# Patient Record
Sex: FEMALE | Race: WHITE | URBAN - METROPOLITAN AREA
[De-identification: names, ages, dates, MRNs, and addresses within clinical notes are randomized per-mention and may not be internally consistent; named-entity substitution may affect disease eponyms.]

---

## 2017-05-16 ENCOUNTER — CARE COORDINATION (OUTPATIENT)
Dept: GASTROENTEROLOGY | Facility: CLINIC | Age: 72
End: 2017-05-16

## 2017-05-16 NOTE — PROGRESS NOTES
Advanced [facility] Clinic Intake:    Referring provider: Dr Lizette Combs, [facility] in [facility], Phone is [phone], Fax is [phone], any nurse can assist     Referred to: [facility] Advanced Endoscopy providers: Dr Mathews, pt was last seen in 2011     Referral Received: 5/15/2017    Records received: Per clinic no recent GI records    MD review date:   Sent to Dr. Mathews to review.                             Requested procedure: Clinic visit    Evaluation/Indication for: Pancreatitis and increase in amylase level. Recent lab was done on 5/9/17- amylase level was 152, abdominal pain.    Clinical History (per RN review of records provided):   Patient aware of request for clinc consultation and ok to be contacted to schedule. Yes   5/16/2017 1600 called to clinic to ask about more recent records/imaging: no recent imaging since 2011.     Progress note from 4/6/2016: states she is having more epigastric pain but not like her past pancreas attacks.    Recommendations/Orders:    Per Dr. Mathews:  1. Clinic visit.     Sent to scheduling.     Aissatou COLLINS RN Coordinator  Dr. Mathews, Dr. Cantu & Dr. Velarde  Pancreas~Biliary  [phone] #4

## 2017-06-27 ENCOUNTER — TELEPHONE (OUTPATIENT)
Dept: GASTROENTEROLOGY | Facility: CLINIC | Age: 72
End: 2017-06-27

## 2017-06-27 NOTE — TELEPHONE ENCOUNTER
Spoke with Caroline and reminded her of upcoming appointment with Dr. Mathews 7/3.  She plans to attend.

## 2017-07-03 ENCOUNTER — OFFICE VISIT (OUTPATIENT)
Dept: GASTROENTEROLOGY | Facility: CLINIC | Age: 72
End: 2017-07-03

## 2017-07-03 VITALS
HEIGHT: 68 IN | OXYGEN SATURATION: 95 % | HEART RATE: 64 BPM | WEIGHT: 232.7 LBS | BODY MASS INDEX: 35.27 KG/M2 | DIASTOLIC BLOOD PRESSURE: 79 MMHG | SYSTOLIC BLOOD PRESSURE: 149 MMHG | TEMPERATURE: 98.2 F

## 2017-07-03 DIAGNOSIS — Z71.3 NUTRITIONAL COUNSELING: ICD-10-CM

## 2017-07-03 DIAGNOSIS — K85.00 IDIOPATHIC ACUTE PANCREATITIS WITHOUT INFECTION OR NECROSIS: Primary | ICD-10-CM

## 2017-07-03 DIAGNOSIS — K86.1 CHRONIC PANCREATITIS (H): Primary | ICD-10-CM

## 2017-07-03 DIAGNOSIS — K85.90 ACUTE PANCREATITIS: ICD-10-CM

## 2017-07-03 RX ORDER — NICOTINE POLACRILEX 4 MG/1
20 GUM, CHEWING ORAL DAILY
Qty: 90 TABLET | Refills: 3 | Status: ON HOLD | OUTPATIENT
Start: 2017-07-03 | End: 2017-08-10

## 2017-07-03 RX ORDER — AMPICILLIN TRIHYDRATE 250 MG
600 CAPSULE ORAL DAILY
COMMUNITY

## 2017-07-03 ASSESSMENT — ENCOUNTER SYMPTOMS
HYPOTENSION: 0
POLYPHAGIA: 0
CLAUDICATION: 0
CHILLS: 0
WEIGHT GAIN: 1
NIGHT SWEATS: 0
POLYDIPSIA: 0
ABDOMINAL PAIN: 1
BLOATING: 1
HEARTBURN: 1
LIGHT-HEADEDNESS: 0
ALTERED TEMPERATURE REGULATION: 0
FEVER: 0
SYNCOPE: 0
INCREASED ENERGY: 0
SKIN CHANGES: 0
ORTHOPNEA: 0
VOMITING: 0
SLEEP DISTURBANCES DUE TO BREATHING: 0
WEIGHT LOSS: 0
DECREASED APPETITE: 0
NAUSEA: 0
POOR WOUND HEALING: 0
EXERCISE INTOLERANCE: 0
TACHYCARDIA: 0
LEG SWELLING: 1
LEG PAIN: 0
HYPERTENSION: 0
PALPITATIONS: 0
NAIL CHANGES: 0
FATIGUE: 1
HALLUCINATIONS: 0

## 2017-07-03 ASSESSMENT — PAIN SCALES - GENERAL: PAINLEVEL: NO PAIN (0)

## 2017-07-03 NOTE — PROGRESS NOTES
Pt referred by Primary Dr Combs for evaluation of recurrent acute and chronic pancreatitis    Seen by us 2011  Note below  CONSULTATION     SUBJECTIVE:  Caroline is a very pleasant 66-year-old who was referred by Dr. Onesimo Monroe for unexplained acute recurrent pancreatitis.  Generally, she has been healthy.  She is mildly overweight.  She is remotely post cholecystectomy.  She had a bout of severe pancreatitis in 2009, normal liver enzymes, normal calcium and triglycerides, autoimmune markers.  She had a protracted hospital course with either an infected pseudocyst or peripancreatic abscess, managed percutaneously by Interventional Radiology.  That drain had to be in place for a month, I think up to 6 months because of persistent fistula but it eventually closed when they backed the drain out.  She eventually recovered and did well.  She is on no medications or offending medications or alcohol.  Then, she had a recurrent bout in May with 5 days of hospitalization again.  Liver chemistries, triglycerides and calcium levels normal.  MRCP normal.  To my review, it shows normal pancreatic duct with patent dorsal and ventral ducts that merged normally.  No evidence of pancreas divisum or dilated ducts.  She has had a negative EGD with negative celiac biopsies.  She is otherwise remarkably healthy.  She does not smoke or drink.  MEDICATIONS:  Her medications are all nutritional supplements and vitamins, probiotics.  She is on omeprazole 40 mg twice a day.  PAST MEDICAL HISTORY:  She does have a history of a hiatal hernia.  I am not sure if there is a real reflux history.  Her other history is significant for having a chest tube for pleural effusion around the time of her pancreatitis.  SOCIAL HISTORY:  She is an active person.  She rides her bicycle.  She feels fine now.  She really does not have any significant discomfort.  REVIEW OF SYSTEMS:  Review of systems is otherwise negative.  PHYSICAL EXAMINATION:  On  physical examination, she has no abdominal tenderness.  IMPRESSION:  She has had 2 bouts of unexplained acute recurrent pancreatitis, post cholecystectomy with normal anatomy.  The first one was severe with either peripancreatic necrosis, infected fluid collection, infected necrosis, all drained percutaneously.  She is now doing fine but she has had a second bout in May, self limited.   We spent a total of 40 minutes in counseling, discussing the etiology which could be idiopathic sphincter of Oddi dysfunction and very unlikely any other cause as she has had a normal MRCP.  We talked about the need for endoscopic ultrasound which is tomorrow and I gave her a number of articles that we have written on this topic to review about possible interventions, it would be either continued observation or ERCP with sphincter of Oddi manometry, possible biliary and pancreatic sphincterotomy.  We went over the risks and uncertain benefits, the small chance of a severe life-threatening complication, 10% chance of overall mild complications and the uncertain efficacy, especially in absence of an anatomic defect, some of the controversy.  We are going to give her the articles to return home after her EUS and she will think about whether she would like intervention.  If she would, she will come back to see us.  I would like to thank Dr. Monroe for sending her to see us.   Total time spent was 40 minutes, mostly in counseling.       Hesham Mathews M.D.  Professor                                  EUS in 2011                            - Findings suggestive of pancreas divisum - both                             the dorsal and ventral ducts are small however only                             the dorsal duct appears to communicate with the                             main duct                            - Atrophic pancreas with small pancreatic duct in                             the head, small cyst in the tail and visible                              side-branches. These findings may be sequale of                             severe acute pancreatitis or due to chronic                             pancreatitis (pt denies symptoms other than related                             to the two episodes of acute pancreatitis)                            - Normal CBD with no stones or sludge                            Findings d/w patient and . Informed that a                             repeat MRCP to evluate the pancreatic duct to                             confirm divisum would be helpful. Pt is agreeable.  ERCP 2011  Pancreatic divisum with very small minor papilla                             which was identified only after secretin injection                             and methylene blue spray                            Minor papillotomy done and a temporary plastic                             stent placed in the dorsal main pancreatic duct    SINCE THEN: did amazingly well, no pancreas symptoms, normal diet, no meds other than nutritional supplemnents.   One month ago attack of band like pain, one day, now just pressure like sensation in epigastrium, some reflux type symptoms. Not meal related. Weight gain and new pitting edema in legs.    PEX limited to legs: pitting edema 2+ bilaterally    IMP: 45 mints more than 50% counseling. Recurrent acute pancreatitis, evidence of chronic pancreatitis, divisum, unusually good response to single minor papilla intervention.   Possibilities:  1) restenosis of minor papilla  2) Progression of chronoic pancreatitis  3) Reflux esophagitis.  4) Unrelated to pancreas  5) Edema of unclear origin    REC  1) CMP, lipase, amylase, CBC  2) Return for secretin MRCP, ERCP next day  3) PERT, PPI  4) dietitian today

## 2017-07-03 NOTE — NURSING NOTE
"Chief Complaint   Patient presents with     Consult     New referral        Vitals:    07/03/17 1035   BP: 149/79   Pulse: 64   Temp: 98.2  F (36.8  C)   TempSrc: Oral   SpO2: 95%   Weight: 232 lb 11.2 oz   Height: 5' 8\"       Body mass index is 35.38 kg/(m^2).    Merissa Leger CMA                          "

## 2017-07-03 NOTE — PATIENT INSTRUCTIONS
It was a pleasure to meet you today.  -Do not skip meals. Eat at least 3 meals per day.  -Eat a low fat diet (see additional handouts)  -Take Enzymes as directed. Recommend taking 3 capsules with meals and 2 capsules with snacks. Take enzymes at the beginning of the meal or snack.  If you would like to schedule a follow up visit with the Registered Dietitian. Please call [phone] to schedule.

## 2017-07-03 NOTE — LETTER
7/3/2017       RE: Caroline Soto  [address]  \[facility]\"" [id number]     Dear Colleague,    Thank you for referring your patient, Caroline Soto, to the [facility] PAN[facility] B[facility]. Please see a copy of my visit note below.    Pt referred by Primary Dr Combs for evaluation of recurrent acute and chronic pancreatitis    Seen by us 2011  Note below  CONSULTATION     SUBJECTIVE:  Caroline is a very pleasant 66-year-old who was referred by Dr. Onesimo Monroe for unexplained acute recurrent pancreatitis.  Generally, she has been healthy.  She is mildly overweight.  She is remotely post cholecystectomy.  She had a bout of severe pancreatitis in 2009, normal liver enzymes, normal calcium and triglycerides, autoimmune markers.  She had a protracted hospital course with either an infected pseudocyst or peripancreatic abscess, managed percutaneously by Interventional Radiology.  That drain had to be in place for a month, I think up to 6 months because of persistent fistula but it eventually closed when they backed the drain out.  She eventually recovered and did well.  She is on no medications or offending medications or alcohol.  Then, she had a recurrent bout in May with 5 days of hospitalization again.  Liver chemistries, triglycerides and calcium levels normal.  MRCP normal.  To my review, it shows normal pancreatic duct with patent dorsal and ventral ducts that merged normally.  No evidence of pancreas divisum or dilated ducts.  She has had a negative EGD with negative celiac biopsies.  She is otherwise remarkably healthy.  She does not smoke or drink.  MEDICATIONS:  Her medications are all nutritional supplements and vitamins, probiotics.  She is on omeprazole 40 mg twice a day.  PAST MEDICAL HISTORY:  She does have a history of a hiatal hernia.  I am not sure if there is a real reflux history.  Her other history is significant for having a chest tube for pleural  effusion around the time of her pancreatitis.  SOCIAL HISTORY:  She is an active person.  She rides her bicycle.  She feels fine now.  She really does not have any significant discomfort.  REVIEW OF SYSTEMS:  Review of systems is otherwise negative.  PHYSICAL EXAMINATION:  On physical examination, she has no abdominal tenderness.  IMPRESSION:  She has had 2 bouts of unexplained acute recurrent pancreatitis, post cholecystectomy with normal anatomy.  The first one was severe with either peripancreatic necrosis, infected fluid collection, infected necrosis, all drained percutaneously.  She is now doing fine but she has had a second bout in May, self limited.   We spent a total of 40 minutes in counseling, discussing the etiology which could be idiopathic sphincter of Oddi dysfunction and very unlikely any other cause as she has had a normal MRCP.  We talked about the need for endoscopic ultrasound which is tomorrow and I gave her a number of articles that we have written on this topic to review about possible interventions, it would be either continued observation or ERCP with sphincter of Oddi manometry, possible biliary and pancreatic sphincterotomy.  We went over the risks and uncertain benefits, the small chance of a severe life-threatening complication, 10% chance of overall mild complications and the uncertain efficacy, especially in absence of an anatomic defect, some of the controversy.  We are going to give her the articles to return home after her EUS and she will think about whether she would like intervention.  If she would, she will come back to see us.  I would like to thank Dr. Monroe for sending her to see us.   Total time spent was 40 minutes, mostly in counseling.       Hesham Mathews M.D.  Professor                                  EUS in 2011                            - Findings suggestive of pancreas divisum - both                             the dorsal and ventral ducts are small however only                              the dorsal duct appears to communicate with the                             main duct                            - Atrophic pancreas with small pancreatic duct in                             the head, small cyst in the tail and visible                             side-branches. These findings may be sequale of                             severe acute pancreatitis or due to chronic                             pancreatitis (pt denies symptoms other than related                             to the two episodes of acute pancreatitis)                            - Normal CBD with no stones or sludge                            Findings d/w patient and . Informed that a                             repeat MRCP to evluate the pancreatic duct to                             confirm divisum would be helpful. Pt is agreeable.  ERCP 2011  Pancreatic divisum with very small minor papilla                             which was identified only after secretin injection                             and methylene blue spray                            Minor papillotomy done and a temporary plastic                             stent placed in the dorsal main pancreatic duct    SINCE THEN: did amazingly well, no pancreas symptoms, normal diet, no meds other than nutritional supplemnents.   One month ago attack of band like pain, one day, now just pressure like sensation in epigastrium, some reflux type symptoms. Not meal related. Weight gain and new pitting edema in legs.    PEX limited to legs: pitting edema 2+ bilaterally    IMP: 45 mints more than 50% counseling. Recurrent acute pancreatitis, evidence of chronic pancreatitis, divisum, unusually good response to single minor papilla intervention.   Possibilities:  1) restenosis of minor papilla  2) Progression of chronoic pancreatitis  3) Reflux esophagitis.  4) Unrelated to pancreas  5) Edema of unclear origin    REC  1) CMP, lipase, amylase, CBC  2)  Return for secretin MRCP, ERCP next day  3) PERT, PPI  4) dietitian today     Again, thank you for allowing me to participate in the care of your patient.      Sincerely,    Hesham Mathews MD

## 2017-07-03 NOTE — PROGRESS NOTES
"[facility] Outpatient Medical Nutrition Therapy      Time Spent:  15 minutes  Session Type:  Individual Session  Referring Physician:  Dr. Hesham Mathews  Reason for RD Visit:   Acute and chronic pancreatitis. Starting enzymes.     Nutrition Assessment:  Patient is here for initial visit with Registered Dietitian (RD).  Patient is a 72 y.o. female with history of acute and chronic pancreatitis, GERD, obstructive sleep apnea, DVT and abscess of pancreas. Patient c/o pitting edema. Patient stated that in past, she had been in the hospital for 2 months and did not eat orally only ice chips. Had a PICC line at that time. Believes she may have had TPN and/or NG tube feeding. Also had drains in place at that time. Reported losing 109# during that hospitalization. Stated that overall her diet is healthy and usually eats 2-3 meals per day. Has a tendency to skip lunch meal or have some snacks. See diet recall below. Patient had 2 bouts of acute pancreatitis more recently. Saw MD today, and MD starting patient on Creon 24,000 enzymes.    Height: 68\"  Weight:  232# (105.6 kg). Reported 109# weight loss when was in hospital.  BMI: 35.46. Class II obesity    Diet Recall:    Meal Food    Breakfast Protein shake made with almond milk and spinach    Lunch Skips OR Ancient nutrient drink (water + nutrient powder with added liquid calcium and liquid minerals) OR yogurt with cranberries and hemp seeds and stevia OR sliced deli chicken/roast beef with dill pickle   Dinner Organic beef/organic chicken with kale/spinach/chard/tomatoes and fruit   Snacks Black or green olives (several times per week.) Rare dessert (rice pudding)    Beverages 6-8 cups or more Filtered water, coconut water and sometimes aloe vera juice or pollen burst drink     Alcohol intake: denies     Labs:  reviewed  Pertinent Medications/vitamin and mineral supplements:   Red yeast rice, turmeric, omeparazole, garlic oil, bee pollen, spirulina, 1000 IU vitamin D and " chlorella. Starting Creon 24,000.   Food Allergies:  reported gluten intolerance  Physical Activity:  Walks dog 3-5x/day, tracks steps and takes 8,000-9,000 steps per day. Does Yoga 1x/week. [place] 2 [place]  Nutrition Prescription: Based on ideal body weight of 140# (64kg):  6854-9221 calories (25-30 kcals/kg), 64g protein (1g/kg), 1890 ml fluids (~1 ml/kcal or total fluids per MD).     Nutrition Diagnosis:    Altered GI function related to medical diagnosis, pain after eating as evidenced by patient reports and dx acute and chronic pancreatitis.    Nutrition Intervention:    1. Nutrition Education:   Nutrition Education: Provided diet education for pancreatitis as follows:  -Follow a lowfat diet. Reviewed foods higher in fat and tips/suggestions for selecting lower fat substitutions/foods. Recommended patient aim for ~40-50 g fat or less per day. This is ~25% of calories from fat per day based on estimated calories needs.  -Encouraged patient to not skip meals and aim for at least 3 meals per day. Told patient can eat 4-6 smaller balanced meals/snacks per day if better tolerated.  -Take enzymes as directed. Recommend taking 3 capsules of Creon 24,000 with meals and 2 capsules with snacks. This will provide 682 units of lipase per meal and 455 units lipase per snacks. Gave patient low fat diet and pancreatitis diet education handouts.       Educational Materials Provided:  Lowfat diet handout and pancreatitis medical nutrition therapy education handout. Patient verbalized understanding of education provided. See all recommendations under Goals below.     Goals:  -Do not skip meals. Eat at least 3 meals per day.  -Eat a low fat diet (see additional handouts)  -Take Enzymes as directed. Recommend taking 3 capsules with meals and 2 capsules with snacks. Take enzymes at the beginning of the meal or snack.    Nutrition Monitoring and Evaluation: Will monitor adherence to nutrition recommendations at any future  RD visits.     Further Medical Nutrition Therapy:  PRN  Next Appointment (if applicable):  PRN  Patient was encouraged to call/contact RD with any further questions.    Lindsay Hughes MS, RD, LD

## 2017-07-03 NOTE — NURSING NOTE
Reviewed today's consult and answered patient's questions.  Patient verbalized understanding and agreed to call me with any questions / concerns in the future and confirmed having our contact information.  Please see patient instructions below.

## 2017-07-03 NOTE — PATIENT INSTRUCTIONS
1. Please follow-up with your primary care to address the pitting edema.    2. Creon sent to pharmacy. Take 2-3 with meals and 1-2 with snacks.     3. Omeprazole sent to pharmacy take 20mg daily.     4. We will call you to organize MRCP and ERCP on another visit.       Follow up: MRCP and ERCP     Please call with any questions or concerns regarding your clinic visit today.    It is a pleasure being involved in your health care.    Contacts post-consultation depending on your need:    Schedule Clinic Appointments       [phone]       M-F 7:30 - 5 pm    Aissatou COLLINS RN Coordinator              [phone] #4   M-F 8:00 - 4:30pm    Procedure Scheduling                   [phone]    My Chart is available 24 hours a day and is a secure way to access your records and communicate with your care team.  I strongly recommend signing up if you haven't already done so, if you are comfortable with computers.  If you would like to inquire about this or are having problems with My Chart access, you may call [phone] or go online at [email or web address].  Please allow at least 24 hours for a response and extra time on weekends and Holidays.

## 2017-07-03 NOTE — LETTER
"7/3/2017       RE: Caroline Soto  [address]  [facility] [address]     Dear Colleague,    Thank you for referring your patient, Caroline Soto, to the [facility] GASTROENTEROLOGY AND IBD at [facility]. Please see a copy of my visit note below.    [facility] Outpatient Medical Nutrition Therapy      Time Spent:  15 minutes  Session Type:  Individual Session  Referring Physician:  Dr. Hesham Mathews  Reason for RD Visit:   Acute and chronic pancreatitis. Starting enzymes.     Nutrition Assessment:  Patient is here for initial visit with Registered Dietitian (RD).  Patient is a 72 y.o. female with history of acute and chronic pancreatitis, GERD, obstructive sleep apnea, DVT and abscess of pancreas. Patient c/o pitting edema. Patient stated that in past, she had been in the hospital for 2 months and did not eat orally only ice chips. Had a PICC line at that time. Believes she may have had TPN and/or NG tube feeding. Also had drains in place at that time. Reported losing 109# during that hospitalization. Stated that overall her diet is healthy and usually eats 2-3 meals per day. Has a tendency to skip lunch meal or have some snacks. See diet recall below. Patient had 2 bouts of acute pancreatitis more recently. Saw MD today, and MD starting patient on Creon 24,000 enzymes.    Height: 68\"  Weight:  232# (105.6 kg). Reported 109# weight loss when was in hospital.  BMI: 35.46. Class II obesity    Diet Recall:    Meal Food    Breakfast Protein shake made with almond milk and spinach    Lunch Skips OR Ancient nutrient drink (water + nutrient powder with added liquid calcium and liquid minerals) OR yogurt with cranberries and hemp seeds and stevia OR sliced deli chicken/roast beef with dill pickle   Dinner Organic beef/organic chicken with kale/spinach/chard/tomatoes and fruit   Snacks Black or green olives (several times per week.) Rare dessert (rice pudding)    Beverages 6-8 cups or more " Filtered water, coconut water and sometimes aloe vera juice or pollen burst drink     Alcohol intake: denies     Labs:  reviewed  Pertinent Medications/vitamin and mineral supplements:   Red yeast rice, turmeric, omeparazole, garlic oil, bee pollen, spirulina, 1000 IU vitamin D and chlorella. Starting Creon 24,000.   Food Allergies:  reported gluten intolerance  Physical Activity:  Walks dog 3-5x/day, tracks steps and takes 8,000-9,000 steps per day. Does Yoga 1x/week. [place]  Nutrition Prescription: Based on ideal body weight of 140# (64kg):  5219-2901 calories (25-30 kcals/kg), 64g protein (1g/kg), 1890 ml fluids (~1 ml/kcal or total fluids per MD).     Nutrition Diagnosis:    Altered GI function related to medical diagnosis, pain after eating as evidenced by patient reports and dx acute and chronic pancreatitis.    Nutrition Intervention:    1. Nutrition Education:   Nutrition Education: Provided diet education for pancreatitis as follows:  -Follow a lowfat diet. Reviewed foods higher in fat and tips/suggestions for selecting lower fat substitutions/foods. Recommended patient aim for ~40-50 g fat or less per day. This is ~25% of calories from fat per day based on estimated calories needs.  -Encouraged patient to not skip meals and aim for at least 3 meals per day. Told patient can eat 4-6 smaller balanced meals/snacks per day if better tolerated.  -Take enzymes as directed. Recommend taking 3 capsules of Creon 24,000 with meals and 2 capsules with snacks. This will provide 682 units of lipase per meal and 455 units lipase per snacks. Gave patient low fat diet and pancreatitis diet education handouts.       Educational Materials Provided:  Lowfat diet handout and pancreatitis medical nutrition therapy education handout. Patient verbalized understanding of education provided. See all recommendations under Goals below.     Goals:  -Do not skip meals. Eat at least 3 meals per day.  -Eat a low fat  diet (see additional handouts)  -Take Enzymes as directed. Recommend taking 3 capsules with meals and 2 capsules with snacks. Take enzymes at the beginning of the meal or snack.    Nutrition Monitoring and Evaluation: Will monitor adherence to nutrition recommendations at any future RD visits.     Further Medical Nutrition Therapy:  PRN  Next Appointment (if applicable):  PRN  Patient was encouraged to call/contact RD with any further questions.    Lindsay Hughes, MS, RD, LD

## 2017-07-03 NOTE — MR AVS SNAPSHOT
After Visit Summary   7/3/2017    Caroline Soto    MRN: [id number]           Patient Information     Date Of Birth          1945        Visit Information        Provider Department      7/3/2017 11:00 AM Hesham Mathews MD [facility] Pancreas and Biliary        Today's Diagnoses     Pancreatitis    -  1      Care Instructions    1. Please follow-up with your primary care to address the pitting edema.    2. Creon sent to pharmacy. Take 2-3 with meals and 1-2 with snacks.     3. Omeprazole sent to pharmacy take 20mg daily.     4. We will call you to organize MRCP and ERCP on another visit.       Follow up: MRCP and ERCP     Please call with any questions or concerns regarding your clinic visit today.    It is a pleasure being involved in your health care.    Contacts post-consultation depending on your need:    Schedule Clinic Appointments       [phone]       M-F 7:30 - 5 pm    Aissatou COLLINS RN Coordinator              [phone] #4   M-F 8:00 - 4:30pm    Procedure Scheduling                   [phone]    My Chart is available 24 hours a day and is a secure way to access your records and communicate with your care team.  I strongly recommend signing up if you haven't already done so, if you are comfortable with computers.  If you would like to inquire about this or are having problems with My Chart access, you may call [phone] or go online at [email or web address].  Please allow at least 24 hours for a response and extra time on weekends and Holidays.              Follow-ups after your visit        Additional Services     NUTRITION REFERRAL       To see Lindsay KRAFT                  Future tests that were ordered for you today     Open Future Orders        Priority Expected Expires Ordered    MR Abdomen MRCP w/o & w Contrast Routine  7/3/2018 7/3/2017            Who to contact     Please call your clinic at [phone] to:    Ask questions about your health    Make or cancel  "appointments    Discuss your medicines    Learn about your test results    Speak to your doctor   If you have compliments or concerns about an experience at your clinic, or if you wish to file a complaint, please contact [facility] Patient Relations at [phone] or email us at [email or web address]         Additional Information About Your Visit        [employer] Information     [employer] is an electronic gateway that provides easy, online access to your medical records. With [employer], you can request a clinic appointment, read your test results, renew a prescription or communicate with your care team.     To sign up for [employer] visit the website at [email or web address]   You will be asked to enter the access code listed below, as well as some personal information. Please follow the directions to create your username and password.     Your access code is: [email or web address]  Expires: 2017  6:30 AM     Your access code will  in 90 days. If you need help or a new code, please contact your [facility] or call [phone] for assistance.        Care EveryWhere ID     This is your Care EveryWhere ID. This could be used by other organizations to access your [place] medical records  [id number]        Your Vitals Were     Pulse Temperature Height Pulse Oximetry BMI (Body Mass Index)       64 98.2  F (36.8  C) (Oral) 1.727 m (5' 8\") 95% 35.38 kg/m2        Blood Pressure from Last 3 Encounters:   17 149/79   11 133/58   11 130/59    Weight from Last 3 Encounters:   17 105.6 kg (232 lb 11.2 oz)   11 107.6 kg (237 lb 3.2 oz)   11 103.4 kg (228 lb)              We Performed the Following     ERCP     NUTRITION REFERRAL          Today's Medication Changes          These changes are accurate as of: 7/3/17 12:45 PM.  If you have any questions, ask your nurse or doctor.               Start taking these medicines.     "    Dose/Directions    amylase-lipase-protease 83311 UNITS Cpep per EC capsule   Commonly known as:  CREON   Used for:  Pancreatitis   Started by:  Hesham Mathews MD        Take 2-3 with meals / 1-2 with snacks, up to 15 per day.   Quantity:  450 capsule   Refills:  6       omeprazole 20 MG tablet   Used for:  Pancreatitis   Replaces:  omeprazole 40 MG capsule   Started by:  Hesham Mathews MD        Dose:  20 mg   Take 1 tablet (20 mg) by mouth daily   Quantity:  90 tablet   Refills:  3         Stop taking these medicines if you haven't already. Please contact your care team if you have questions.     HYDROmorphone 2 MG tablet   Commonly known as:  DILAUDID   Stopped by:  Hesham Mathews MD           l-arginine 1000 MG tablet   Stopped by:  Hesham Mathews MD           OMEGA 3-6-9 FATTY ACIDS PO   Stopped by:  Hesham Mathews MD           omeprazole 40 MG capsule   Commonly known as:  priLOSEC   Replaced by:  omeprazole 20 MG tablet   Stopped by:  Hesham Mathews MD           vitamin E 1000 UNIT capsule   Commonly known as:  TOCOPHEROL   Stopped by:  Hesham Mathews MD                Where to get your medicines      These medications were sent to [address] - [address] [facility]  [address] [facility] [address]     Phone:  [phone]     amylase-lipase-protease 86918 UNITS Cpep per EC capsule    omeprazole 20 MG tablet                Primary Care Provider Office Phone # Fax #    Lizette RODRÍGUEZ [place],  [phone]       JEAN HUERTAS [address]   [facility] [address]        Equal Access to Services     [facility] AH: Hadii aad ku hadasho Soomaali, waaxda luqadaha, qaybta kaalmada adeegyada, waxay gerard cao. So [facility] [phone].    ATENCIÓN: Si habla español, tiene a vallejo disposición servicios gratuitos de asistencia lingüística. Llame al [phone].    We comply with applicable federal civil rights laws  and [place] laws. We do not discriminate on the basis of race, color, national origin, age, disability sex, sexual orientation or gender identity.            Thank you!     Thank you for choosing [facility] PANCREAS AND BILIARY  for your care. Our goal is always to provide you with excellent care. Hearing back from our patients is one way we can continue to improve our services. Please take a few minutes to complete the written survey that you may receive in the mail after your visit with us. Thank you!             Your Updated Medication List - Protect others around you: Learn how to safely use, store and throw away your medicines at www.disposemymeds.org.          This list is accurate as of: 7/3/17 12:45 PM.  Always use your most recent med list.                   Brand Name Dispense Instructions for use Diagnosis    amylase-lipase-protease 47314 UNITS Cpep per EC capsule    CREON    450 capsule    Take 2-3 with meals / 1-2 with snacks, up to 15 per day.    Pancreatitis       Bee Pollen 500 MG Tabs      Take 1 capsule by mouth daily.        Chlorella 500 MG Caps      Take 9 tablets by mouth daily.        ODORLESS GARLIC 500 MG Tabs   Generic drug:  Garlic Oil      Take 1 capsule by mouth daily. With cayenne pepper        omeprazole 20 MG tablet     90 tablet    Take 1 tablet (20 mg) by mouth daily    Pancreatitis       red yeast rice 600 MG Caps      Take 600 mg by mouth daily        Spirulina 500 MG Tabs      Take 9 tablets by mouth daily.        TURMERIC PO           * UNABLE TO FIND      MEDICATION NAME: Hemp parts        * UNABLE TO FIND      MEDICATION NAME: Jett Seeds        VITAMIN D PO      Take 1,000 Units by mouth daily. With citrate        * Notice:  This list has 2 medication(s) that are the same as other medications prescribed for you. Read the directions carefully, and ask your doctor or other care provider to review them with you.

## 2017-07-03 NOTE — MR AVS SNAPSHOT
After Visit Summary   7/3/2017    Caroline Soto    MRN: [id number]           Patient Information     Date Of Birth          1945        Visit Information        Provider Department      7/3/2017 2:00 PM Lindsay Hughes RD M [facility] Gastroenterology and IBD        Care Instructions    It was a pleasure to meet you today.  -Do not skip meals. Eat at least 3 meals per day.  -Eat a low fat diet (see additional handouts)  -Take Enzymes as directed. Recommend taking 3 capsules with meals and 2 capsules with snacks. Take enzymes at the beginning of the meal or snack.  If you would like to schedule a follow up visit with the Registered Dietitian. Please call [phone] to schedule.            Follow-ups after your visit        Your next 10 appointments already scheduled     Jul 03, 2017  2:00 PM CDT   (Arrive by 1:45 PM)   New Patient Visit with AJIT Vides [facility] Gastroenterology and IBD ([facility] [place])    [address] [place] [address] [phone]              Future tests that were ordered for you today     Open Future Orders        Priority Expected Expires Ordered    MR Abdomen MRCP w/o & w Contrast Routine  7/3/2018 7/3/2017            Who to contact     Please call your clinic at [phone] to:    Ask questions about your health    Make or cancel appointments    Discuss your medicines    Learn about your test results    Speak to your doctor   If you have compliments or concerns about an experience at your clinic, or if you wish to file a complaint, please contact [facility] Patient Relations at [phone] or email us at [email or web address]         Additional Information About Your Visit        MyChart Information     [employer] is an electronic gateway that provides easy, online access to your medical records. With [employer], you can request a clinic appointment, read your test results, renew a  prescription or communicate with your care team.     To sign up for [employer] visit the website at [email or web address]   You will be asked to enter the access code listed below, as well as some personal information. Please follow the directions to create your username and password.     Your access code is: [email or web address]  Expires: 2017  6:30 AM     Your access code will  in 90 days. If you need help or a new code, please contact your [facility] or call [phone] for assistance.        Care EveryWhere ID     This is your Care EveryWhere ID. This could be used by other organizations to access your [place] medical records  [id number]         Blood Pressure from Last 3 Encounters:   17 149/79   11 133/58   11 130/59    Weight from Last 3 Encounters:   17 105.6 kg (232 lb 11.2 oz)   11 107.6 kg (237 lb 3.2 oz)   11 103.4 kg (228 lb)              Today, you had the following     No orders found for display         Today's Medication Changes          These changes are accurate as of: 7/3/17  1:46 PM.  If you have any questions, ask your nurse or doctor.               Start taking these medicines.        Dose/Directions    amylase-lipase-protease 16792 UNITS Cpep per EC capsule   Commonly known as:  CREON   Used for:  Idiopathic acute pancreatitis without infection or necrosis   Started by:  Hesham Mathews MD        Take 2-3 with meals / 1-2 with snacks, up to 15 per day.   Quantity:  450 capsule   Refills:  6       omeprazole 20 MG tablet   Used for:  Idiopathic acute pancreatitis without infection or necrosis   Replaces:  omeprazole 40 MG capsule   Started by:  Hesham Mathews MD        Dose:  20 mg   Take 1 tablet (20 mg) by mouth daily   Quantity:  90 tablet   Refills:  3         Stop taking these medicines if you haven't already. Please contact your care team if you have questions.     HYDROmorphone 2 MG tablet    Commonly known as:  DILAUDID   Stopped by:  Hesham Mathews MD           l-arginine 1000 MG tablet   Stopped by:  Hesham Mathews MD           OMEGA 3-6-9 FATTY ACIDS PO   Stopped by:  Hesham Mathews MD           omeprazole 40 MG capsule   Commonly known as:  priLOSEC   Replaced by:  omeprazole 20 MG tablet   Stopped by:  Hesham Mathews MD           vitamin E 1000 UNIT capsule   Commonly known as:  TOCOPHEROL   Stopped by:  Hesham Mathews MD                Where to get your medicines      [facility] [address] [facility] [place] [facility] [address] [facility]  [address] [facility] [address]     Phone:  [phone]     amylase-lipase-protease 57885 UNITS Cpep per EC capsule    omeprazole 20 MG tablet                Primary Care Provider Office Phone # Fax #    Lizette RODRÍGUEZ DO [place] [phone]       JEAN HUERTAS [address]   [facility] [address]        Equal Access to Services     [facility] AH: Hadii aad ku hadasho Soomaali, waaxda luqadaha, qaybta kaalmada adeegyada, waxay idiin hayaan jeff kharaerick comer . So [facility] [phone].    ATENCIÓN: Si marcosla español, tiene a vallejo disposición servicios gratuitos de asistencia lingüística. Rajivame al [phone].    We comply with applicable federal civil rights laws and [place] laws. We do not discriminate on the basis of race, color, national origin, age, disability sex, sexual orientation or gender identity.            Thank you!     Thank you for choosing [facility] GASTROENTEROLOGY AND IBD  for your care. Our goal is always to provide you with excellent care. Hearing back from our patients is one way we can continue to improve our services. Please take a few minutes to complete the written survey that you may receive in the mail after your visit with us. Thank you!             Your Updated Medication List - Protect others around you: Learn how to safely use, store and throw away your medicines at  www.disposemymeds.org.          This list is accurate as of: 7/3/17  1:46 PM.  Always use your most recent med list.                   Brand Name Dispense Instructions for use Diagnosis    amylase-lipase-protease 34247 UNITS Cpep per EC capsule    CREON    450 capsule    Take 2-3 with meals / 1-2 with snacks, up to 15 per day.    Idiopathic acute pancreatitis without infection or necrosis       Bee Pollen 500 MG Tabs      Take 1 capsule by mouth daily.        Chlorella 500 MG Caps      Take 9 tablets by mouth daily.        ODORLESS GARLIC 500 MG Tabs   Generic drug:  Garlic Oil      Take 1 capsule by mouth daily. With cayenne pepper        omeprazole 20 MG tablet     90 tablet    Take 1 tablet (20 mg) by mouth daily    Idiopathic acute pancreatitis without infection or necrosis       red yeast rice 600 MG Caps      Take 600 mg by mouth daily        Spirulina 500 MG Tabs      Take 9 tablets by mouth daily.        TURMERIC PO           * UNABLE TO FIND      MEDICATION NAME: Hemp parts        * UNABLE TO FIND      MEDICATION NAME: Jett Seeds        VITAMIN D PO      Take 1,000 Units by mouth daily. With citrate        * Notice:  This list has 2 medication(s) that are the same as other medications prescribed for you. Read the directions carefully, and ask your doctor or other care provider to review them with you.

## 2017-07-05 ENCOUNTER — TELEPHONE (OUTPATIENT)
Dept: GASTROENTEROLOGY | Facility: CLINIC | Age: 72
End: 2017-07-05

## 2017-07-05 NOTE — TELEPHONE ENCOUNTER
Pt calling regarding Rx sent by Dr. Mathews on 7/3/17. States that she did receive two of the prescriptions he sent, however, it was her understanding that he would also be sending a prescription for a diuretic which has not been received. She is hoping to  all her prescriptions today and is hoping this can be sent soon. Pt also states that the Creon starter kit she was given was not accepted because she is on Medicare. Prescription can be sent to [facility]. Pt prefers return call on mobile. Ivanna REYES LPN

## 2017-07-06 ENCOUNTER — CARE COORDINATION (OUTPATIENT)
Dept: GASTROENTEROLOGY | Facility: CLINIC | Age: 72
End: 2017-07-06

## 2017-07-06 NOTE — PROGRESS NOTES
Sharon called call center asking for medication- diuretic.   Called and reminded her that she needed to follow-up with her PCP in regards to the edema in her legs. Verbalized understanding.   She will also call her insurance company and find out if they have a preferred pancreatic enzyme and to let this RN know so we an possibly order it as she is on Medicare and most of the enzymes will be expensive.   Verbalized understanding.     Aissatou COLLINS RN Coordinator  Dr. Mathews, Dr. Cantu & Dr. Drew   Advanced Endoscopy  [phone] #4

## 2017-07-18 ENCOUNTER — TELEPHONE (OUTPATIENT)
Dept: GASTROENTEROLOGY | Facility: CLINIC | Age: 72
End: 2017-07-18

## 2017-07-19 ENCOUNTER — TELEPHONE (OUTPATIENT)
Dept: GASTROENTEROLOGY | Facility: CLINIC | Age: 72
End: 2017-07-19

## 2017-07-19 NOTE — TELEPHONE ENCOUNTER
Caroline is informed that she is scheduled on 08/10/2017 at 12:35 PM with an arrival time of 10:35 AM.  She is also informed that she is scheduled for an MRCP on 08/09/2017 at 10:45 AM.  She was instructed to get an H&P done with her PCP.  Her  will accompany her to her appointments.  All instructions along with the H&P form will be mailed to Caroline's address listed in EPIC, it was confirmed during this call to be current.    SR 07/19/2017  1006a

## 2017-07-28 ENCOUNTER — TRANSFERRED RECORDS (OUTPATIENT)
Dept: HEALTH INFORMATION MANAGEMENT | Facility: CLINIC | Age: 72
End: 2017-07-28

## 2017-08-07 ENCOUNTER — TELEPHONE (OUTPATIENT)
Dept: GASTROENTEROLOGY | Facility: CLINIC | Age: 72
End: 2017-08-07

## 2017-08-07 NOTE — TELEPHONE ENCOUNTER
Spoke to Caroline and gave her the CPT code for an ERCP.   She will give this information to her insurance company and will call me with any questions or concerns.     SR 08/07/[id number]

## 2017-08-09 ENCOUNTER — ANESTHESIA EVENT (OUTPATIENT)
Dept: SURGERY | Facility: CLINIC | Age: 72
End: 2017-08-09
Payer: COMMERCIAL

## 2017-08-09 NOTE — ANESTHESIA PREPROCEDURE EVALUATION
Anesthesia Evaluation     . Pt has had prior anesthetic. Type: General    No history of anesthetic complications          ROS/MED HX    ENT/Pulmonary:     (+)sleep apnea, uses CPAP , . .    Neurologic:  - neg neurologic ROS     Cardiovascular:  - neg cardiovascular ROS   (+) ----. : . . . :. . No previous cardiac testing       METS/Exercise Tolerance:  >4 METS   Hematologic:     (+) History of blood clots pt is not anticoagulated, -      Musculoskeletal:  - neg musculoskeletal ROS       GI/Hepatic:     (+) GERD       Renal/Genitourinary:  - ROS Renal section negative       Endo:  - neg endo ROS       Psychiatric:  - neg psychiatric ROS       Infectious Disease:  - neg infectious disease ROS       Malignancy:      - no malignancy   Other:    - neg other ROS                 Physical Exam  Normal systems: cardiovascular, pulmonary and dental    Airway   Mallampati: II  TM distance: >3 FB  Neck ROM: limited    Dental     Cardiovascular       Pulmonary                     Anesthesia Plan      History & Physical Review  History and physical reviewed and following examination; no interval change.    ASA Status:  3 .    NPO Status:  > 8 hours    Plan for General and ETT with Propofol induction. Maintenance will be Balanced.    PONV prophylaxis:  Ondansetron (or other 5HT-3) and Dexamethasone or Solumedrol     Procedure:   ENDOSCOPIC RETROGRADE CHOLANGIOPANCREATOGRAM (N/A Mouth) Endoscopic Retrograde Cholangiopancreatogram       Anesthesia type: General    Pre-op diagnosis: Chronic Pancreatitis     [facility]: [facility]    Surgeon: Hesham Mathews MD    Plan: GAET. NPO after MN.      Postoperative Care  Postoperative pain management:  IV analgesics.      Consents  Anesthetic plan, risks, benefits and alternatives discussed with:  Patient..        ANESTHESIA PREOP EVALUATION    HPI: Caroline Soto is a 72 year old female who presents for Procedure(s):  Endoscopic Retrograde Cholangiopancreatogram  - Wound Class:        No personal or family h/o anesthesia problems    PMHx/PSHx/ROS:  Past Medical History:   Diagnosis Date     Abscess of pancreas      DVT, lower extremity (H)      GERD (gastroesophageal reflux disease)      Obstructive sleep apnea     uses CPAP     Pancreatitis 2009    Acute pancreatitis, SIMONE drain placed for drainage       Past Surgical History:   Procedure Laterality Date     APPENDECTOMY       CHOLECYSTECTOMY       ENDOSCOPIC RETROGRADE CHOLANGIOPANCREATOGRAM  9/6/2011    Procedure:ENDOSCOPIC RETROGRADE CHOLANGIOPANCREATOGRAM; Endoscopic Retrograde Cholangiopancreatogram, minor piliar sphineceterectomy and pancreatic stent placement; Surgeon:WESLEY COLINDRES; Location: OR      [facility] END[facility] W EUS  6/28/2011    Procedure:COMBINED ENDOSCOPIC ULTRASOUND, ESOPHAGOSCOPY, GASTROSCOPY, DUODENOSCOPY (EGD); Surgeon:JOSSIE SPANGLER; Location: GI     HERNIA REPAIR       stress fracture heel  1985    repaired     TONSILLECTOMY & ADENOIDECTOMY         Soc Hx:   Social History   Substance Use Topics     Smoking status: Never Smoker     Smokeless tobacco: Never Used     Alcohol use No       Allergies:   Allergies   Allergen Reactions     Aspirin [Dihydroxyaluminum Aminoacetate]      Stomach irritation     Morphine Sulfate Visual Disturbance     No Clinical Screening - See Comments      lanolan cream-causes burning     Coumadin [Warfarin Sodium] Hives and Rash     Also causes headaches and joint aches     Flu Virus Vaccine Rash       Meds:     No current facility-administered medications on file prior to encounter.   Current Outpatient Prescriptions on File Prior to Encounter:  red yeast rice 600 MG CAPS Take 600 mg by mouth daily   UNABLE TO FIND MEDICATION NAME: Jett Seeds   TURMERIC PO    UNABLE TO FIND MEDICATION NAME: Hemp parts   omeprazole 20 MG tablet Take 1 tablet (20 mg) by mouth daily   amylase-lipase-protease (CREON) 86270 UNITS CPEP per EC capsule Take 2-3 with meals / 1-2 with snacks, up to 15  per day.   Garlic Oil (ODORLESS GARLIC) 500 MG TABS Take 1 capsule by mouth daily. With cayenne pepper    Bee Pollen 500 MG TABS Take 1 capsule by mouth daily.   Spirulina 500 MG TABS Take 9 tablets by mouth daily.   Misc Natural Products (CHLORELLA) 500 MG CAPS Take 9 tablets by mouth daily.   VITAMIN D PO Take 1,000 Units by mouth daily. With citrate        Labs:    Blood Bank:  No results found for: ABO, RH, AS  BMP:  Recent Labs   Lab Test  09/07/11 0610   NA  141   POTASSIUM  3.8   CHLORIDE  108   CO2  27   BUN  14   CR  0.72   GLC  95   EARNEST  9.2     CBC:   Recent Labs   Lab Test  09/07/11 0610 09/06/11   0658   WBC   --   4.1   RBC   --   4.56   HGB  12.0  13.8   HCT   --   40.4   MCV   --   89   MCH   --   30.3   MCHC   --   34.2   RDW   --   13.7   PLT   --   166     Coags:  Recent Labs   Lab Test  09/06/11 0658 06/28/11   0844   INR  0.96  0.98   PTT   --   32       No results found for this or any previous visit.  No results found for this or any previous visit (from the past 8760 hour(s)).        Caroline Soto [[id number]]  Female - 72 year old - [birth year]  ENDOSCOPIC RETROGRADE CHOLANGIOPANCREATOGRAM (N/A Mouth)       Preprocedure Vitals      No BP, pulse, respiration, SpO2, or temperature recorded.   Height:      Weight:   105.2 kg (232 lb)  (08/09/17)   BMI:      IBW:             Allergies      ASPIRIN [DIHYDROXYALUMINUM AMINOACETATE], MORPHINE SULFATE, NO CLINICAL SCREENING - SEE COMMENTS, COUMADIN [WARFARIN SODIUM], FLU VIRUS VACCINE       Prescription Medications         Last Taken Last Updated     red yeast rice 600 MG CAPS    Taking 07/03/17 1046     UNABLE TO FIND    Taking 07/03/17 1046     TURMERIC PO    Taking 07/03/17 1046     UNABLE TO FIND    Taking 07/03/17 1046     omeprazole 20 MG tablet          amylase-lipase-protease (CREON) 44582 UNITS CPEP per EC capsule          Garlic Oil (ODORLESS GARLIC) 500 MG TABS    Taking 07/03/17 1046     Bee Pollen 500 MG TABS    Taking 07/03/17  1046     Spirulina 500 MG TABS    Taking 07/03/17 1046     Misc Natural Products (CHLORELLA) 500 MG CAPS    Taking 07/03/17 1046     VITAMIN D PO    Taking 07/03/17 1046       Hematology Labs        No relevant labs found       Electrolyte Labs        No relevant labs found       Other Labs        No relevant labs found        Substance History      Smoking Status: Never Smoker     Smokeless Tobacco Status: Never Used     Alcohol use: No     Drug use: No       Facility Administered Medications      No medications found       Anesthesia Family History      No family medical history recorded       Problem List      Acute pancreatitis        Medical History        Pancreatitis GERD (gastroesophageal reflux disease)     DVT, lower extremity (H) Obstructive sleep apnea     Abscess of pancreas        Surgical History      CHOLECYSTECTOMY APPENDECTOMY     TONSILLECTOMY & ADENOIDECTOMY HERNIA REPAIR     stress fracture heel [facility] W EUS     ENDOSCOPIC RETROGRADE CHOLANGIOPANCREATOGRAM        Obstetric History      The patient has not been asked about pregnancy.              Procedure: Procedure(s):  Endoscopic Retrograde Cholangiopancreatogram  - Wound Class:     HPI: 72 year old female with /o DVT in LE, GERD, IRISH on CPAP, and pancreatitis who requires anesthesia for Procedure(s):  Endoscopic Retrograde Cholangiopancreatogram  - Wound Class: .     PMH/PSH:  Past Medical History:   Diagnosis Date     Abscess of pancreas      DVT, lower extremity (H)      GERD (gastroesophageal reflux disease)      Obstructive sleep apnea     uses CPAP     Pancreatitis 2009    Acute pancreatitis, SIMONE drain placed for drainage       Past Surgical History:   Procedure Laterality Date     APPENDECTOMY       CHOLECYSTECTOMY       ENDOSCOPIC RETROGRADE CHOLANGIOPANCREATOGRAM  9/6/2011    Procedure:ENDOSCOPIC RETROGRADE CHOLANGIOPANCREATOGRAM; Endoscopic Retrograde Cholangiopancreatogram, minor piliar sphineceterectomy and pancreatic  stent placement; Surgeon:WESLEY COLINDRES; Location: OR      [facility] ENDOSCOPY W EUS  6/28/2011    Procedure:COMBINED ENDOSCOPIC ULTRASOUND, ESOPHAGOSCOPY, GASTROSCOPY, DUODENOSCOPY (EGD); Surgeon:JOSSIE SPANGLER; Location:[facility]     HERNIA REPAIR       stress fracture heel  1985    repaired     TONSILLECTOMY & ADENOIDECTOMY           No current facility-administered medications on file prior to encounter.   Current Outpatient Prescriptions on File Prior to Encounter:  red yeast rice 600 MG CAPS Take 600 mg by mouth daily   UNABLE TO FIND MEDICATION NAME: Jett Seeds   TURMERIC PO    UNABLE TO FIND MEDICATION NAME: Hemp parts   omeprazole 20 MG tablet Take 1 tablet (20 mg) by mouth daily   amylase-lipase-protease (CREON) 84529 UNITS CPEP per EC capsule Take 2-3 with meals / 1-2 with snacks, up to 15 per day.   Garlic Oil (ODORLESS GARLIC) 500 MG TABS Take 1 capsule by mouth daily. With cayenne pepper    Bee Pollen 500 MG TABS Take 1 capsule by mouth daily.   Spirulina 500 MG TABS Take 9 tablets by mouth daily.   Misc Natural Products (CHLORELLA) 500 MG CAPS Take 9 tablets by mouth daily.   VITAMIN D PO Take 1,000 Units by mouth daily. With citrate        SH:   Social History   Substance Use Topics     Smoking status: Never Smoker     Smokeless tobacco: Never Used     Alcohol use No       Allergies:   Allergies   Allergen Reactions     Aspirin [Dihydroxyaluminum Aminoacetate]      Stomach irritation     Morphine Sulfate Visual Disturbance     No Clinical Screening - See Comments      lanolan cream-causes burning     Coumadin [Warfarin Sodium] Hives and Rash     Also causes headaches and joint aches     Flu Virus Vaccine Rash       NPO Status: Per ASA Guidelines    Labs:    Blood Bank:  No results found for: ABO, RH, AS  BMP:  Recent Labs   Lab Test  09/07/11   0610   NA  141   POTASSIUM  3.8   CHLORIDE  108   CO2  27   BUN  14   CR  0.72   GLC  95   EARNEST  9.2     CBC:   Recent Labs   Lab Test  09/07/11   0610   09/06/11   0658   WBC   --   4.1   RBC   --   4.56   HGB  12.0  13.8   HCT   --   40.4   MCV   --   89   MCH   --   30.3   MCHC   --   34.2   RDW   --   13.7   PLT   --   166     Coags:  Recent Labs   Lab Test  09/06/11   0658  06/28/11   0844   INR  0.96  0.98   PTT   --   32     To be discussed with staff.   - ASA 3   - GETA with standard ASA monitors, IV induction, balanced anesthetic  - PIV  - Antibiotics per surgery  - PONV prophylaxis    Steven Landaverde DO  [facility]   Department of Anesthesiology  P: [phone]                  .

## 2017-08-10 ENCOUNTER — HOSPITAL ENCOUNTER (OUTPATIENT)
Facility: CLINIC | Age: 72
Discharge: HOME | End: 2017-08-10
Attending: INTERNAL MEDICINE | Admitting: INTERNAL MEDICINE
Payer: COMMERCIAL

## 2017-08-10 ENCOUNTER — SURGERY (OUTPATIENT)
Age: 72
End: 2017-08-10

## 2017-08-10 ENCOUNTER — ANESTHESIA (OUTPATIENT)
Dept: SURGERY | Facility: CLINIC | Age: 72
End: 2017-08-10
Payer: COMMERCIAL

## 2017-08-10 ENCOUNTER — APPOINTMENT (OUTPATIENT)
Dept: GENERAL RADIOLOGY | Facility: CLINIC | Age: 72
End: 2017-08-10
Attending: INTERNAL MEDICINE
Payer: COMMERCIAL

## 2017-08-10 VITALS
DIASTOLIC BLOOD PRESSURE: 60 MMHG | HEIGHT: 68 IN | HEART RATE: 57 BPM | BODY MASS INDEX: 33.88 KG/M2 | RESPIRATION RATE: 18 BRPM | OXYGEN SATURATION: 100 % | WEIGHT: 223.55 LBS | SYSTOLIC BLOOD PRESSURE: 129 MMHG | TEMPERATURE: 97.9 F

## 2017-08-10 LAB
ALBUMIN SERPL-MCNC: 3.8 G/DL (ref 3.4–5)
ALP SERPL-CCNC: 65 U/L (ref 40–150)
ALT SERPL W P-5'-P-CCNC: 29 U/L (ref 0–50)
AMYLASE SERPL-CCNC: 29 U/L (ref 30–110)
ANION GAP SERPL CALCULATED.3IONS-SCNC: 6 MMOL/L (ref 3–14)
AST SERPL W P-5'-P-CCNC: 24 U/L (ref 0–45)
BILIRUB SERPL-MCNC: 0.7 MG/DL (ref 0.2–1.3)
BUN SERPL-MCNC: 17 MG/DL (ref 7–30)
CALCIUM SERPL-MCNC: 9.8 MG/DL (ref 8.5–10.1)
CHLORIDE SERPL-SCNC: 108 MMOL/L (ref 94–109)
CO2 SERPL-SCNC: 25 MMOL/L (ref 20–32)
CREAT SERPL-MCNC: 0.74 MG/DL (ref 0.52–1.04)
ERYTHROCYTE [DISTWIDTH] IN BLOOD BY AUTOMATED COUNT: 13.1 % (ref 10–15)
GFR SERPL CREATININE-BSD FRML MDRD: 77 ML/MIN/1.7M2
GLUCOSE SERPL-MCNC: 108 MG/DL (ref 70–99)
HCT VFR BLD AUTO: 40.3 % (ref 35–47)
HGB BLD-MCNC: 13.9 G/DL (ref 11.7–15.7)
LIPASE SERPL-CCNC: 95 U/L (ref 73–393)
MCH RBC QN AUTO: 30.4 PG (ref 26.5–33)
MCHC RBC AUTO-ENTMCNC: 34.5 G/DL (ref 31.5–36.5)
MCV RBC AUTO: 88 FL (ref 78–100)
PLATELET # BLD AUTO: 171 10E9/L (ref 150–450)
POTASSIUM SERPL-SCNC: 4.1 MMOL/L (ref 3.4–5.3)
PROT SERPL-MCNC: 7.3 G/DL (ref 6.8–8.8)
RBC # BLD AUTO: 4.57 10E12/L (ref 3.8–5.2)
SODIUM SERPL-SCNC: 139 MMOL/L (ref 133–144)
WBC # BLD AUTO: 4 10E9/L (ref 4–11)

## 2017-08-10 PROCEDURE — 82150 ASSAY OF AMYLASE: CPT | Performed by: INTERNAL MEDICINE

## 2017-08-10 PROCEDURE — 25000125 ZZHC RX 250: Performed by: INTERNAL MEDICINE

## 2017-08-10 PROCEDURE — 36415 COLL VENOUS BLD VENIPUNCTURE: CPT | Performed by: INTERNAL MEDICINE

## 2017-08-10 PROCEDURE — C9399 UNCLASSIFIED DRUGS OR BIOLOG: HCPCS | Performed by: NURSE ANESTHETIST, CERTIFIED REGISTERED

## 2017-08-10 PROCEDURE — 36000061 ZZH SURGERY LEVEL 3 W FLUORO 1ST 30 MIN - UMMC: Performed by: INTERNAL MEDICINE

## 2017-08-10 PROCEDURE — 80053 COMPREHEN METABOLIC PANEL: CPT | Performed by: INTERNAL MEDICINE

## 2017-08-10 PROCEDURE — 25000128 H RX IP 250 OP 636: Performed by: NURSE ANESTHETIST, CERTIFIED REGISTERED

## 2017-08-10 PROCEDURE — C1726 CATH, BAL DIL, NON-VASCULAR: HCPCS | Performed by: INTERNAL MEDICINE

## 2017-08-10 PROCEDURE — 37000009 ZZH ANESTHESIA TECHNICAL FEE, EACH ADDTL 15 MIN: Performed by: INTERNAL MEDICINE

## 2017-08-10 PROCEDURE — 25000566 ZZH SEVOFLURANE, EA 15 MIN: Performed by: INTERNAL MEDICINE

## 2017-08-10 PROCEDURE — 36000059 ZZH SURGERY LEVEL 3 EA 15 ADDTL MIN UMMC: Performed by: INTERNAL MEDICINE

## 2017-08-10 PROCEDURE — 85027 COMPLETE CBC AUTOMATED: CPT | Performed by: INTERNAL MEDICINE

## 2017-08-10 PROCEDURE — 25000128 H RX IP 250 OP 636: Performed by: INTERNAL MEDICINE

## 2017-08-10 PROCEDURE — 25000125 ZZHC RX 250: Performed by: NURSE ANESTHETIST, CERTIFIED REGISTERED

## 2017-08-10 PROCEDURE — 27210794 ZZH OR GENERAL SUPPLY STERILE: Performed by: INTERNAL MEDICINE

## 2017-08-10 PROCEDURE — 40000170 ZZH STATISTIC PRE-PROCEDURE ASSESSMENT II: Performed by: INTERNAL MEDICINE

## 2017-08-10 PROCEDURE — 71000014 ZZH RECOVERY PHASE 1 LEVEL 2 FIRST HR: Performed by: INTERNAL MEDICINE

## 2017-08-10 PROCEDURE — C1877 STENT, NON-COAT/COV W/O DEL: HCPCS | Performed by: INTERNAL MEDICINE

## 2017-08-10 PROCEDURE — 40000277 XR SURGERY CARM FLUORO LESS THAN 5 MIN W STILLS: Mod: TC

## 2017-08-10 PROCEDURE — 71000027 ZZH RECOVERY PHASE 2 EACH 15 MINS: Performed by: INTERNAL MEDICINE

## 2017-08-10 PROCEDURE — 37000008 ZZH ANESTHESIA TECHNICAL FEE, 1ST 30 MIN: Performed by: INTERNAL MEDICINE

## 2017-08-10 PROCEDURE — 25500064 ZZH RX 255 OP 636: Performed by: INTERNAL MEDICINE

## 2017-08-10 PROCEDURE — 83690 ASSAY OF LIPASE: CPT | Performed by: INTERNAL MEDICINE

## 2017-08-10 PROCEDURE — C1769 GUIDE WIRE: HCPCS | Performed by: INTERNAL MEDICINE

## 2017-08-10 DEVICE — STENT FREEMAN PANCREA FLEX 5FRX9CM SGL PIGTAIL: Type: IMPLANTABLE DEVICE | Site: PANCREATIC DUCT | Status: FUNCTIONAL

## 2017-08-10 DEVICE — STENT FREEMAN PANCREA FLEX 5FRX5CM SGL PIGTAIL: Type: IMPLANTABLE DEVICE | Site: BILE DUCT | Status: FUNCTIONAL

## 2017-08-10 RX ORDER — HYDROMORPHONE HYDROCHLORIDE 1 MG/ML
.3-.5 INJECTION, SOLUTION INTRAMUSCULAR; INTRAVENOUS; SUBCUTANEOUS EVERY 10 MIN PRN
Status: DISCONTINUED | OUTPATIENT
Start: 2017-08-10 | End: 2017-08-10 | Stop reason: HOSPADM

## 2017-08-10 RX ORDER — FLUMAZENIL 0.1 MG/ML
0.2 INJECTION, SOLUTION INTRAVENOUS
Status: DISCONTINUED | OUTPATIENT
Start: 2017-08-10 | End: 2017-08-10 | Stop reason: HOSPADM

## 2017-08-10 RX ORDER — INDOMETHACIN 50 MG/1
100 SUPPOSITORY RECTAL
Status: DISCONTINUED | OUTPATIENT
Start: 2017-08-10 | End: 2017-08-10 | Stop reason: HOSPADM

## 2017-08-10 RX ORDER — NALOXONE HYDROCHLORIDE 0.4 MG/ML
.1-.4 INJECTION, SOLUTION INTRAMUSCULAR; INTRAVENOUS; SUBCUTANEOUS
Status: DISCONTINUED | OUTPATIENT
Start: 2017-08-10 | End: 2017-08-10 | Stop reason: HOSPADM

## 2017-08-10 RX ORDER — ONDANSETRON 2 MG/ML
INJECTION INTRAMUSCULAR; INTRAVENOUS PRN
Status: DISCONTINUED | OUTPATIENT
Start: 2017-08-10 | End: 2017-08-10

## 2017-08-10 RX ORDER — EPHEDRINE SULFATE 50 MG/ML
INJECTION, SOLUTION INTRAMUSCULAR; INTRAVENOUS; SUBCUTANEOUS PRN
Status: DISCONTINUED | OUTPATIENT
Start: 2017-08-10 | End: 2017-08-10

## 2017-08-10 RX ORDER — PROPOFOL 10 MG/ML
INJECTION, EMULSION INTRAVENOUS PRN
Status: DISCONTINUED | OUTPATIENT
Start: 2017-08-10 | End: 2017-08-10

## 2017-08-10 RX ORDER — ONDANSETRON 4 MG/1
4 TABLET, ORALLY DISINTEGRATING ORAL EVERY 30 MIN PRN
Status: DISCONTINUED | OUTPATIENT
Start: 2017-08-10 | End: 2017-08-10 | Stop reason: HOSPADM

## 2017-08-10 RX ORDER — LIDOCAINE 40 MG/G
CREAM TOPICAL
Status: DISCONTINUED | OUTPATIENT
Start: 2017-08-10 | End: 2017-08-10 | Stop reason: HOSPADM

## 2017-08-10 RX ORDER — ONDANSETRON 2 MG/ML
4 INJECTION INTRAMUSCULAR; INTRAVENOUS EVERY 30 MIN PRN
Status: DISCONTINUED | OUTPATIENT
Start: 2017-08-10 | End: 2017-08-10 | Stop reason: HOSPADM

## 2017-08-10 RX ORDER — IOPAMIDOL 510 MG/ML
INJECTION, SOLUTION INTRAVASCULAR PRN
Status: DISCONTINUED | OUTPATIENT
Start: 2017-08-10 | End: 2017-08-10 | Stop reason: HOSPADM

## 2017-08-10 RX ORDER — SODIUM CHLORIDE, SODIUM LACTATE, POTASSIUM CHLORIDE, CALCIUM CHLORIDE 600; 310; 30; 20 MG/100ML; MG/100ML; MG/100ML; MG/100ML
INJECTION, SOLUTION INTRAVENOUS CONTINUOUS
Status: DISCONTINUED | OUTPATIENT
Start: 2017-08-10 | End: 2017-08-10 | Stop reason: HOSPADM

## 2017-08-10 RX ORDER — LIDOCAINE HYDROCHLORIDE 20 MG/ML
INJECTION, SOLUTION INFILTRATION; PERINEURAL PRN
Status: DISCONTINUED | OUTPATIENT
Start: 2017-08-10 | End: 2017-08-10

## 2017-08-10 RX ORDER — FENTANYL CITRATE 50 UG/ML
INJECTION, SOLUTION INTRAMUSCULAR; INTRAVENOUS PRN
Status: DISCONTINUED | OUTPATIENT
Start: 2017-08-10 | End: 2017-08-10

## 2017-08-10 RX ORDER — MEPERIDINE HYDROCHLORIDE 25 MG/ML
12.5 INJECTION INTRAMUSCULAR; INTRAVENOUS; SUBCUTANEOUS
Status: DISCONTINUED | OUTPATIENT
Start: 2017-08-10 | End: 2017-08-10 | Stop reason: HOSPADM

## 2017-08-10 RX ORDER — SODIUM CHLORIDE, SODIUM LACTATE, POTASSIUM CHLORIDE, CALCIUM CHLORIDE 600; 310; 30; 20 MG/100ML; MG/100ML; MG/100ML; MG/100ML
INJECTION, SOLUTION INTRAVENOUS CONTINUOUS PRN
Status: DISCONTINUED | OUTPATIENT
Start: 2017-08-10 | End: 2017-08-10

## 2017-08-10 RX ORDER — FENTANYL CITRATE 50 UG/ML
25-50 INJECTION, SOLUTION INTRAMUSCULAR; INTRAVENOUS
Status: DISCONTINUED | OUTPATIENT
Start: 2017-08-10 | End: 2017-08-10 | Stop reason: HOSPADM

## 2017-08-10 RX ADMIN — GLUCAGON HYDROCHLORIDE 0.4 MG: 1 INJECTION, POWDER, FOR SOLUTION INTRAMUSCULAR; INTRAVENOUS; SUBCUTANEOUS at 14:33

## 2017-08-10 RX ADMIN — GLUCAGON HYDROCHLORIDE 0.4 MG: 1 INJECTION, POWDER, FOR SOLUTION INTRAMUSCULAR; INTRAVENOUS; SUBCUTANEOUS at 13:44

## 2017-08-10 RX ADMIN — HUMAN SECRETIN 10 MCG: 16 INJECTION, POWDER, LYOPHILIZED, FOR SOLUTION INTRAVENOUS at 14:18

## 2017-08-10 RX ADMIN — ONDANSETRON 4 MG: 2 INJECTION INTRAMUSCULAR; INTRAVENOUS at 14:51

## 2017-08-10 RX ADMIN — METHYLENE BLUE 8 ML: 5 INJECTION INTRAVENOUS at 14:21

## 2017-08-10 RX ADMIN — Medication 5 MG: at 13:10

## 2017-08-10 RX ADMIN — PROPOFOL 175 MG: 10 INJECTION, EMULSION INTRAVENOUS at 12:42

## 2017-08-10 RX ADMIN — HUMAN SECRETIN 0.2 MCG: 16 INJECTION, POWDER, LYOPHILIZED, FOR SOLUTION INTRAVENOUS at 13:43

## 2017-08-10 RX ADMIN — FENTANYL CITRATE 50 MCG: 50 INJECTION, SOLUTION INTRAMUSCULAR; INTRAVENOUS at 12:41

## 2017-08-10 RX ADMIN — HUMAN SECRETIN 15.8 MCG: 16 INJECTION, POWDER, LYOPHILIZED, FOR SOLUTION INTRAVENOUS at 13:50

## 2017-08-10 RX ADMIN — ROCURONIUM BROMIDE 40 MG: 10 INJECTION INTRAVENOUS at 12:43

## 2017-08-10 RX ADMIN — PHENYLEPHRINE HYDROCHLORIDE 100 MCG: 10 INJECTION, SOLUTION INTRAMUSCULAR; INTRAVENOUS; SUBCUTANEOUS at 14:04

## 2017-08-10 RX ADMIN — PHENYLEPHRINE HYDROCHLORIDE 100 MCG: 10 INJECTION, SOLUTION INTRAMUSCULAR; INTRAVENOUS; SUBCUTANEOUS at 13:24

## 2017-08-10 RX ADMIN — SIMETHICONE 2 ML: 63.3; 3.7 SOLUTION/ DROPS ORAL at 14:00

## 2017-08-10 RX ADMIN — PHENYLEPHRINE HYDROCHLORIDE 100 MCG: 10 INJECTION, SOLUTION INTRAMUSCULAR; INTRAVENOUS; SUBCUTANEOUS at 14:10

## 2017-08-10 RX ADMIN — GLUCAGON HYDROCHLORIDE 0.4 MG: 1 INJECTION, POWDER, FOR SOLUTION INTRAMUSCULAR; INTRAVENOUS; SUBCUTANEOUS at 14:17

## 2017-08-10 RX ADMIN — SUGAMMADEX 200 MG: 100 INJECTION, SOLUTION INTRAVENOUS at 14:51

## 2017-08-10 RX ADMIN — GLUCAGON HYDROCHLORIDE 0.4 MG: 1 INJECTION, POWDER, FOR SOLUTION INTRAMUSCULAR; INTRAVENOUS; SUBCUTANEOUS at 13:22

## 2017-08-10 RX ADMIN — IOPAMIDOL 50 ML: 510 INJECTION, SOLUTION INTRAVASCULAR at 14:30

## 2017-08-10 RX ADMIN — FENTANYL CITRATE 50 MCG: 50 INJECTION, SOLUTION INTRAMUSCULAR; INTRAVENOUS at 12:26

## 2017-08-10 RX ADMIN — LIDOCAINE HYDROCHLORIDE 80 MG: 20 INJECTION, SOLUTION INFILTRATION; PERINEURAL at 12:42

## 2017-08-10 RX ADMIN — SODIUM CHLORIDE, POTASSIUM CHLORIDE, SODIUM LACTATE AND CALCIUM CHLORIDE: 600; 310; 30; 20 INJECTION, SOLUTION INTRAVENOUS at 12:24

## 2017-08-10 NOTE — IP AVS SNAPSHOT
MRN:[id number]                      After Visit Summary   8/10/2017    Caroline Soto    MRN: [id number]           Thank you!     Thank you for choosing [facility] for your care. Our goal is always to provide you with excellent care. Hearing back from our patients is one way we can continue to improve our services. Please take a few minutes to complete the written survey that you may receive in the mail after you visit with us. Thank you!        Patient Information     Date Of Birth          1945        About your hospital stay     You were admitted on:  August 10, 2017 You last received care in the:  Same Day Surgery [facility]    You were discharged on:  August 10, 2017       Who to Call     For medical emergencies, please call 911.  For non-urgent questions about your medical care, please call your primary care provider or clinic, [phone]  For questions related to your surgery, please call your surgery clinic        Attending Provider     Provider Specialty    Hesham Mathews MD Gastroenterology       Primary Care Provider Office Phone # Fax #    Lizette ANNE Combs,  [phone]      After Care Instructions     Discharge Instructions       Resume pre procedure diet            Discharge Instructions       Restart home medications.                  Further instructions from your care team       [facility], [facility]  Take it easy when you get home.  Remember, same day surgery DOES NOT MEAN SAME DAY RECOVERY! Healing is a gradual process.   You will need some time to recover- you may be more tired than you realize at first.  Rest and relax for at least the first 24 hours at home.  You'll feel better and heal faster if you take good care of yourself.   Same-Day Surgery   Adult Discharge Orders & Instructions     For 24 hours after surgery    1. Get plenty of rest.  A responsible adult must stay with you for at least 24 hours after you  leave the hospital.   2. Do not drive or use heavy equipment.  If you have weakness or tingling, don't drive or use heavy equipment until this feeling goes away.  3. Do not drink alcohol.  4. Avoid strenuous or risky activities.  Ask for help when climbing stairs.   5. You may feel lightheaded.  IF so, sit for a few minutes before standing.  Have someone help you get up.   6. If you have nausea (feel sick to your stomach): Drink only clear liquids such as apple juice, ginger ale, broth or 7-Up.  Rest may also help.  Be sure to drink enough fluids.  Move to a regular diet as you feel able.  7. You may have a slight fever. Call the doctor if your fever is over 100 F (37.7 C) (taken under the tongue) or lasts longer than 24 hours.  8. You may have a dry mouth, a sore throat, muscle aches or trouble sleeping.  These should go away after 24 hours.  9. Do not make important or legal decisions.   Call your doctor for any of the followin.  Signs of infection (fever, growing tenderness at the surgery site, a large amount of drainage or bleeding, severe pain, foul-smelling drainage, redness, swelling).    2. It has been over 8 to 10 hours since surgery and you are still not able to urinate (pass water).    3.  Headache for over 24 hours.    4.  Numbness, tingling or weakness the day after surgery (if you had spinal anesthesia).  Use this number to contact the clinic during regular buisiness hours only please.  To contact a doctor, call _Dr Mathews's office at [phone]__ or:        Use this number if the clinic is closed; this is a hospital answering service  [phone] and ask for the resident on call for   ___GI on call ____ (answered 24 hours a day)      Emergency Department:    [facility]: [phone]       (TTY for hearing impaired: [phone]    You have a diagnosis of Obstructive Sleep Apnea noted in your medical history. You need to have a responsible adult with you for at least 24 hours. It is  "recommended that you wear CPAP or other device if prescribed by your MD at night, as well as during that day when taking naps. The residual effects of anesthesia and side effects of medications that your have received are the basis for this recommendations. -This is a nursing recommendation.-    Pending Results     No orders found from 2017 to 2017.            Admission Information     Date & Time Department Dept. Phone    8/10/2017 Same Day Surgery [facility] [place] [phone]      Your Vitals Were     Blood Pressure Pulse Temperature Respirations Height Weight    129/60 57 97.9  F (36.6  C) (Oral) 18 1.715 m (5' 7.5\") 101.4 kg (223 lb 8.7 oz)    Pulse Oximetry BMI (Body Mass Index)                100% 34.5 kg/m2          MyChart Information     [employer] lets you send messages to your doctor, view your test results, renew your prescriptions, schedule appointments and more. To sign up, go to [email or web address] . Click on \"Log in\" on the left side of the screen, which will take you to the Welcome page. Then click on \"Sign up Now\" on the right side of the page.     You will be asked to enter the access code listed below, as well as some personal information. Please follow the directions to create your username and password.     Your access code is: [email or web address]  Expires: 2017  6:30 AM     Your access code will  in 90 days. If you need help or a new code, please call your [facility] clinic or [phone].        Care EveryWhere ID     This is your Care EveryWhere ID. This could be used by other organizations to access your [facility] medical records  [id number]        Equal Access to Services     FRANCIS MCCORMICK AH: Hadlou Carter, fabiano hubbard, sandra cortes. So [facility] [phone].    ATENCIÓN: Si habla español, tiene a vallejo disposición servicios gratuitos de asistencia lingüística. Llame al [phone].    We comply with " applicable federal civil rights laws and [place] laws. We do not discriminate on the basis of race, color, national origin, age, disability sex, sexual orientation or gender identity.               Review of your medicines      CONTINUE these medicines which have NOT CHANGED        Dose / Directions    Bee Pollen 500 MG Tabs        Dose:  1 capsule   Take 1 capsule by mouth daily.   Refills:  0       Chlorella 500 MG Caps        Dose:  9 tablet   Take 9 tablets by mouth daily.   Refills:  0       ODORLESS GARLIC 500 MG Tabs   Generic drug:  Garlic Oil        Dose:  1 capsule   Take 1 capsule by mouth daily. With cayenne pepper   Refills:  0       red yeast rice 600 MG Caps        Dose:  600 mg   Take 600 mg by mouth daily   Refills:  0       Spirulina 500 MG Tabs        Dose:  9 tablet   Take 9 tablets by mouth daily.   Refills:  0       TURMERIC PO        Refills:  0       * UNABLE TO FIND        MEDICATION NAME: Hemp parts   Refills:  0       * UNABLE TO FIND        MEDICATION NAME: Jett Seeds   Refills:  0       * Notice:  This list has 2 medication(s) that are the same as other medications prescribed for you. Read the directions carefully, and ask your doctor or other care provider to review them with you.             Protect others around you: Learn how to safely use, store and throw away your medicines at www.disposemymeds.org.             Medication List: This is a list of all your medications and when to take them. Check marks below indicate your daily home schedule. Keep this list as a reference.      Medications           Morning Afternoon Evening Bedtime As Needed    Bee Pollen 500 MG Tabs   Take 1 capsule by mouth daily.                                Chlorella 500 MG Caps   Take 9 tablets by mouth daily.                                ODORLESS GARLIC 500 MG Tabs   Take 1 capsule by mouth daily. With cayenne pepper   Generic drug:  Garlic Oil                                red yeast rice 600 MG Caps    Take 600 mg by mouth daily                                Spirulina 500 MG Tabs   Take 9 tablets by mouth daily.                                TURMERIC PO                                * UNABLE TO FIND   MEDICATION NAME: Hemp parts                                * UNABLE TO FIND   MEDICATION NAME: Jett Seeds                                * Notice:  This list has 2 medication(s) that are the same as other medications prescribed for you. Read the directions carefully, and ask your doctor or other care provider to review them with you.

## 2017-08-10 NOTE — DISCHARGE INSTRUCTIONS
[facility], [place]  Take it easy when you get home.  Remember, same day surgery DOES NOT MEAN SAME DAY RECOVERY! Healing is a gradual process.   You will need some time to recover- you may be more tired than you realize at first.  Rest and relax for at least the first 24 hours at home.  You'll feel better and heal faster if you take good care of yourself.   Same-Day Surgery   Adult Discharge Orders & Instructions     For 24 hours after surgery    1. Get plenty of rest.  A responsible adult must stay with you for at least 24 hours after you leave the hospital.   2. Do not drive or use heavy equipment.  If you have weakness or tingling, don't drive or use heavy equipment until this feeling goes away.  3. Do not drink alcohol.  4. Avoid strenuous or risky activities.  Ask for help when climbing stairs.   5. You may feel lightheaded.  IF so, sit for a few minutes before standing.  Have someone help you get up.   6. If you have nausea (feel sick to your stomach): Drink only clear liquids such as apple juice, ginger ale, broth or 7-Up.  Rest may also help.  Be sure to drink enough fluids.  Move to a regular diet as you feel able.  7. You may have a slight fever. Call the doctor if your fever is over 100 F (37.7 C) (taken under the tongue) or lasts longer than 24 hours.  8. You may have a dry mouth, a sore throat, muscle aches or trouble sleeping.  These should go away after 24 hours.  9. Do not make important or legal decisions.   Call your doctor for any of the followin.  Signs of infection (fever, growing tenderness at the surgery site, a large amount of drainage or bleeding, severe pain, foul-smelling drainage, redness, swelling).    2. It has been over 8 to 10 hours since surgery and you are still not able to urinate (pass water).    3.  Headache for over 24 hours.    4.  Numbness, tingling or weakness the day after surgery (if you had spinal anesthesia).  Use this number to  contact the clinic during regular buisiness hours only please.  To contact a doctor, call _Dr Mathews's office at [phone]__ or:        Use this number if the clinic is closed; this is a hospital answering service  [phone] and ask for the resident on call for   ___GI on call ____ (answered 24 hours a day)      Emergency Department:    [facility]: [phone]       (TTY for hearing impaired: [phone]    You have a diagnosis of Obstructive Sleep Apnea noted in your medical history. You need to have a responsible adult with you for at least 24 hours. It is recommended that you wear CPAP or other device if prescribed by your MD at night, as well as during that day when taking naps. The residual effects of anesthesia and side effects of medications that your have received are the basis for this recommendations. -This is a nursing recommendation.-

## 2017-08-10 NOTE — OR NURSING
"While checking pt's name bracelet, pt stated that her middle initial is \"A\", not \"N\".   Admissions notified of issue.  Dr Richardson notified and stated that name must be changed to avoid potential mistakes/errors.    "

## 2017-08-10 NOTE — BRIEF OP NOTE
[facility], [place]  Gastroenterology Brief Operative Note    Pre-operative diagnosis: Abdominal pain   Post-operative diagnosis Same   Procedure: ERCP   Surgeon: Hesham Mathews MD   Assistants(s): Mingo Vyas MD   Anesthesia: General endotracheal anesthesia   Estimated blood loss: Minimal    Total IV fluids: (See anesthesia record)   Blood transfusion: No transfusion was given during surgery   Total urine output: (See anesthesia record)   Drains: None   Specimens: None   Implants: 5 Fr x 9 cm Burleson pancreatic stent, 5 Fr x 5 cm Burleson pancreatic stent   Findings:  film identified a migrated pancreatic stent into the dorsal pancreatic duct. Minor papilla eventually identified and cannulated with the aid of topical methylene blue and IV secretin. The minor papillotomy site was dilated with a 4 mm balloon after which the migrated stent spontaneously protruded out of the minor papilla and was grasped with a rat-tooth forceps and removed. Two pancreatic stents were placed across the minor papilla to facilitate drainage and dilate the minor papillotomy site.   Complications: None   Condition: Stable   Comments:      Recommendations:         See dictated procedure report for full details (found in chart review under 'Procedures')    - Observe in Same Day for possible discharge  - Check amylase and lipase 2 hours post-procedure  - Discharge home if minimal or no pain and no significant elevation in pancreatic enzymes  - AXR in 3-4 weeks to ensure spontaneous stent passage  - Further intervention depending on symptoms and response to today's therapy     Mingo Vyas MD  [phone]

## 2017-08-10 NOTE — OR NURSING
Assumed care of pt @ 5420 - pt and spouse in with dr Reid ho 'd for discharge per MD at this time.  Discharge instructions to pt and responsible adult.  All questions regarding discharge information answered.  Pt and responsible adult verbalized understanding of all discharge instruction information given.

## 2017-08-10 NOTE — ANESTHESIA CARE TRANSFER NOTE
Patient: Caroline Soto    Procedure(s):  Endoscopic Retrograde Cholangiopancreatogram with pancreatic duct ballon dilatation and stents exchanged - Wound Class: II-Clean Contaminated    Diagnosis: Chronic Pancreatitis   Diagnosis Additional Information: No value filed.    Anesthesia Type:   General, ETT     Note:  Airway :Face Mask  Patient transferred to:PACU  Comments: VSS on arrival, report to RN.       Vitals: (Last set prior to Anesthesia Care Transfer)    CRNA VITALS  8/10/2017 1429 - 8/10/2017 1504      8/10/2017             Pulse: 81    SpO2: 100 %    Resp Rate (observed): 20                Electronically Signed By: HECTOR Harris CRNA  August 10, 2017  3:04 PM

## 2017-08-10 NOTE — IP AVS SNAPSHOT
[facility] [address] [address]    Phone:  [phone]                                       After Visit Summary   8/10/2017    Caroline Soto    MRN: [id number]           After Visit Summary Signature Page     I have received my discharge instructions, and my questions have been answered. I have discussed any challenges I see with this plan with the nurse or doctor.    ..........................................................................................................................................  Patient/Patient Representative Signature      ..........................................................................................................................................  Patient Representative Print Name and Relationship to Patient    ..................................................               ................................................  Date                                            Time    ..........................................................................................................................................  Reviewed by Signature/Title    ...................................................              ..............................................  Date                                                            Time

## 2017-08-11 ENCOUNTER — CARE COORDINATION (OUTPATIENT)
Dept: GASTROENTEROLOGY | Facility: CLINIC | Age: 72
End: 2017-08-11

## 2017-08-11 DIAGNOSIS — Z46.89 ENCOUNTER FOR REMOVAL OF PANCREATIC STENT: Primary | ICD-10-CM

## 2017-08-11 NOTE — PROGRESS NOTES
Post ERCP (8/9/2017) with Dr. Mathews: Follow-up    Post procedure recommendations: AXR in 3-4 weeks to ensure spontaneous stent passage    Patient states she is on her way home. She states the car ride is not that great for her stomach but she is fine otherwise. She denies nausea, vomiting and fevers. She will also call her insurance company and see what the preferred pancreatic enzyme is and let this RN know. She will get her x-ray done as well and we will have her images reviewed by the MD. Will fax her order to her PCP as requested.     Orders placed: Xray due September 6th.     Contact information verified for future questions/concerns.    Aissatou COLLINS RN Coordinator  Dr. Mathews, Dr. Cantu & Dr. Drew  [facility]  [phone]

## 2017-08-14 LAB — ERCP: NORMAL

## 2017-09-19 ENCOUNTER — CARE COORDINATION (OUTPATIENT)
Dept: GASTROENTEROLOGY | Facility: CLINIC | Age: 72
End: 2017-09-19

## 2017-09-19 NOTE — PROGRESS NOTES
Received message from Caroline to send her order for her x-ray to [facility] attn: Shelby  Fx: [phone].     Faxed order for x-ray as requested.     Aissatou COLLINS RN Coordinator  Dr. Mathews, Dr. Cantu & Dr. Drew   [facility]  [phone]

## 2017-09-20 ENCOUNTER — TRANSFERRED RECORDS (OUTPATIENT)
Dept: HEALTH INFORMATION MANAGEMENT | Facility: CLINIC | Age: 72
End: 2017-09-20

## 2017-09-25 ENCOUNTER — CARE COORDINATION (OUTPATIENT)
Dept: GASTROENTEROLOGY | Facility: CLINIC | Age: 72
End: 2017-09-25

## 2017-09-25 NOTE — PROGRESS NOTES
Received final read on stent verification x-ray.  Called [facility] phone [phone] fax [phone] to get copy of film.  Spoke with radiology department who will mail film, address given.    Scanned final read into Epic.    Micha FORTE RN Coordinator  Dr. Mathews, Dr. Cantu & Dr. Drew  [facility]  [phone]

## 2017-10-02 ENCOUNTER — CARE COORDINATION (OUTPATIENT)
Dept: GASTROENTEROLOGY | Facility: CLINIC | Age: 72
End: 2017-10-02

## 2017-10-02 NOTE — PROGRESS NOTES
Stent follow-up for ERCP 8/10/2017 with Dr. Mathews.    Images reviewed by provider: Stents are gone  ......This time!     How is she feeling?     Called patient and advised of the above. She state she feels great. She is taking the enzymes and it took her while but she has built up her stamina. Still gets tired if she does too much but she feels wonderful otherwise.     Aissatou COLLINS RN Coordinator  Dr. Mathews, Dr. Cantu & Dr. Drew   [facility]  [phone]

## 2017-12-18 NOTE — ANESTHESIA POSTPROCEDURE EVALUATION
Patient: Caroline Soto    Procedure(s):  Endoscopic Retrograde Cholangiopancreatogram with pancreatic duct ballon dilatation and stents exchanged - Wound Class: II-Clean Contaminated    Diagnosis:Chronic Pancreatitis   Diagnosis Additional Information: No value filed.    Anesthesia Type:  General, ETT    Note:  Anesthesia Post Evaluation    Patient location during evaluation: PACU       Comments: This is an attestetion by me as medical director responsible for closing open records as surrogate for those anesthesiologists who took care of the patient but no longer affiliated with our department. This is part of my administrative obligations towards my department, and I did not participate in the clinical care of theses patients. Therefore I am not responsible for any possible adverse outcome that might be related to the anesthesia care above        Last vitals:  Vitals:    08/10/17 1717 08/10/17 1800 08/10/17 1815   BP: 148/72 125/62 129/60   Pulse:      Resp:  18 18   Temp:   36.6  C (97.9  F)   SpO2: 98% 99% 100%         Electronically Signed By: Jeffrey Hines MD  December 18, 2017  12:29 PM

## (undated) DEVICE — TAPE DURAPORE 3" SILK 1538-3

## (undated) DEVICE — GUIDEWIRE NOVAGOLD .018X260CM STR TIP M00552000

## (undated) DEVICE — PACK ENDOSCOPY GI CUSTOM UMMC

## (undated) DEVICE — SUCTION MANIFOLD DORNOCH ULTRA CART UL-CL500

## (undated) DEVICE — ENDO ADPT CATH ROTATABLE SIDE ARM G22677

## (undated) DEVICE — STENT FREEMAN PANCREA FLEX 4FRX07CM W/O FLANGE SGL PIGTAIL
Type: IMPLANTABLE DEVICE | Site: PANCREATIC DUCT | Status: NON-FUNCTIONAL
Removed: 2017-08-10

## (undated) DEVICE — CANNULA BILIARY CONTOUR ERCP .018"X210CM 5-4-3 TIP

## (undated) DEVICE — ENDO TUBING CO2 SMARTCAP STERILE DISP 100145CO2EXT

## (undated) DEVICE — ENDO CATH BALLOON DILATION HURRICANE 04MMX4X180CM M00545900

## (undated) DEVICE — INFLATION DEVICE BIG 60 ENDO-AN6012

## (undated) DEVICE — BIOPSY VALVE BIOSHIELD 00711135

## (undated) DEVICE — KIT ENDO FIRST STEP DISINFECTANT 200ML W/POUCH EP-4

## (undated) DEVICE — ENDO FUSION OMNI-TOME 21 FS-OMNI-21 G48675

## (undated) DEVICE — GUIDEWIRE NOVAGOLD .018X480CM STR TIP M00552010

## (undated) DEVICE — SOL WATER IRRIG 1000ML BOTTLE 2F7114

## (undated) DEVICE — STENT FREEMAN PANCREA FLEX 5FRX7CM SGL PIGTAIL
Type: IMPLANTABLE DEVICE | Site: PANCREATIC DUCT | Status: NON-FUNCTIONAL
Removed: 2017-08-10

## (undated) RX ORDER — LIDOCAINE HYDROCHLORIDE 20 MG/ML
INJECTION, SOLUTION EPIDURAL; INFILTRATION; INTRACAUDAL; PERINEURAL
Status: DISPENSED
Start: 2017-08-10

## (undated) RX ORDER — WATER 10 ML/10ML
INJECTION INTRAMUSCULAR; INTRAVENOUS; SUBCUTANEOUS
Status: DISPENSED
Start: 2017-08-10

## (undated) RX ORDER — GLYCOPYRROLATE 0.2 MG/ML
INJECTION, SOLUTION INTRAMUSCULAR; INTRAVENOUS
Status: DISPENSED
Start: 2017-08-10

## (undated) RX ORDER — FENTANYL CITRATE 50 UG/ML
INJECTION, SOLUTION INTRAMUSCULAR; INTRAVENOUS
Status: DISPENSED
Start: 2017-08-10

## (undated) RX ORDER — ROCURONIUM BROMIDE 50 MG/5 ML
SYRINGE (ML) INTRAVENOUS
Status: DISPENSED
Start: 2017-08-10

## (undated) RX ORDER — ONDANSETRON 2 MG/ML
INJECTION INTRAMUSCULAR; INTRAVENOUS
Status: DISPENSED
Start: 2017-08-10